# Patient Record
Sex: MALE | Race: BLACK OR AFRICAN AMERICAN
[De-identification: names, ages, dates, MRNs, and addresses within clinical notes are randomized per-mention and may not be internally consistent; named-entity substitution may affect disease eponyms.]

---

## 2018-01-24 ENCOUNTER — HOSPITAL ENCOUNTER (OUTPATIENT)
Dept: HOSPITAL 88 - CT | Age: 63
End: 2018-01-24
Attending: INTERNAL MEDICINE
Payer: COMMERCIAL

## 2018-01-24 DIAGNOSIS — R10.9: Primary | ICD-10-CM

## 2018-01-24 LAB
BUN SERPL-MCNC: 11 MG/DL (ref 7–26)
BUN/CREAT SERPL: 13 (ref 6–25)
EGFRCR SERPLBLD CKD-EPI 2021: > 60 ML/MIN (ref 60–?)

## 2018-01-24 PROCEDURE — 82565 ASSAY OF CREATININE: CPT

## 2018-01-24 PROCEDURE — 36415 COLL VENOUS BLD VENIPUNCTURE: CPT

## 2018-01-24 PROCEDURE — 84520 ASSAY OF UREA NITROGEN: CPT

## 2018-01-24 PROCEDURE — 74177 CT ABD & PELVIS W/CONTRAST: CPT

## 2018-01-24 NOTE — DIAGNOSTIC IMAGING REPORT
PROCEDURE: CT ABDOMEN AND PELVIS WITH CONTRAST

 

TECHNIQUE: 

The abdomen and pelvis were scanned utilizing a multidetector helical 

scanner from the diaphragm to the lesser trochanter after the IV 

administration of 100 cc of Isovue 370 and the oral administration of 

water. Coronal and sagittal multiplanar reformations were obtained.

 

COMPARISON: None.

 

INDICATIONS:   ABDOMEN PAIN

 

FINDINGS:

LOWER THORAX: Left lower lobe subsegmental atelectasis. Otherwise 

unremarkable.

 

HEPATOBILIARY: No focal hepatic lesion or intrahepatic biliary ductal 

dilatation. Gallbladder is unremarkable.

SPLEEN: No splenomegaly.

PANCREAS: No focal masses or ductal dilatation.

 

ADRENALS: No adrenal nodules.

KIDNEYS/URETERS: Subcentimeter hypoattenuating lesions in the upper and 

lower pole of the left kidney are too small to further characterize the 

likely to represent small cysts. 1.3 cm hypoattenuating lesion in the 

lower pole the left kidney has average internal attenuation 15-20 

Hounsfield units, compatible with a cyst. Thin internal septations are 

noted on series 301, image 61. No hydronephrosis, calculi, or 

additional renal mass lesion.

PELVIC ORGANS/BLADDER: The urinary bladder is poorly distended but 

otherwise unremarkable. The prostate is enlarged, measuring 5.4 cm 

transversely.

 

PERITONEUM / RETROPERITONEUM: No ascites. No pneumoperitoneum.

LYMPH NODES: No pelvic sidewall, retroperitoneal, or mesenteric 

lymphadenopathy.

VESSELS: There is atherosclerotic calcification of the abdominal aorta, 

major branch vessels, and iliac arterial systems, which are moderately 

tortuous. No aneurysmal dilatation. The portal vein, splenic vein, and 

central superior mesenteric vein are patent.

 

GI TRACT: The large bowel is notable for scattered diverticula along 

the descending and sigmoid colon, without wall thickening or 

inflammatory change. The transverse colon is collapsed and poorly 

evaluated. The appendix is normal. Postsurgical changes at the 

gastroesophageal junction probably related to fundoplication. There is 

no small bowel dilatation to suggest obstruction.

 

BONES AND SOFT TISSUES: No focal soft tissue abnormalities. Small fat 

containing umbilical hernia. Subtle diffusely sclerotic appearance of 

the L2 vertebral body (see sagittal image 65). Multilevel degenerative 

disc disease and degenerative facet arthropathy of the lumbar spine.

 

IMPRESSION:

 

No acute intra-abdominal or pelvic CT abnormalities.

 

Large bowel diverticulosis without evidence of diverticulitis.

 

Subtle diffuse sclerotic appearance of the L2 vertebral body may 

reflect stress-related changes. However, in the setting of 

prostatomegaly, prostate cancer with osseous metastasis is an 

additional consideration. Correlation with serum PSA and nuclear 

medicine bone scan is suggested.

 

Minimally complex, Bosniak category 2F left renal cyst. Followup CT or 

MRI of the abdomen with and without contrast (renal mass protocol) in 

6-12 months is suggested to assess for stability. 

Dictated by:  Reji Mills M.D. on 1/24/2018 at 9:40     

Electronically approved by:  Reji Mills M.D. on 1/24/2018 at 9:40

## 2021-10-28 ENCOUNTER — HOSPITAL ENCOUNTER (OUTPATIENT)
Dept: HOSPITAL 88 - CT | Age: 66
End: 2021-10-28
Attending: INTERNAL MEDICINE
Payer: MEDICARE

## 2021-10-28 DIAGNOSIS — R10.9: Primary | ICD-10-CM

## 2021-10-28 LAB
BUN SERPL-MCNC: 9 MG/DL (ref 7–26)
BUN/CREAT SERPL: 10 (ref 6–25)
EGFRCR SERPLBLD CKD-EPI 2021: 107 ML/MIN (ref 60–?)

## 2021-10-28 PROCEDURE — 84520 ASSAY OF UREA NITROGEN: CPT

## 2021-10-28 PROCEDURE — 74177 CT ABD & PELVIS W/CONTRAST: CPT

## 2021-10-28 PROCEDURE — 82565 ASSAY OF CREATININE: CPT

## 2021-10-28 PROCEDURE — 36415 COLL VENOUS BLD VENIPUNCTURE: CPT
